# Patient Record
Sex: FEMALE | Race: WHITE | Employment: FULL TIME | URBAN - METROPOLITAN AREA
[De-identification: names, ages, dates, MRNs, and addresses within clinical notes are randomized per-mention and may not be internally consistent; named-entity substitution may affect disease eponyms.]

---

## 2019-01-21 ENCOUNTER — HOSPITAL ENCOUNTER (OUTPATIENT)
Dept: PHYSICAL THERAPY | Age: 29
Discharge: HOME | End: 2019-01-21
Payer: COMMERCIAL

## 2019-01-21 PROCEDURE — 97161 PT EVAL LOW COMPLEX 20 MIN: CPT

## 2019-01-21 PROCEDURE — 97110 THERAPEUTIC EXERCISES: CPT

## 2019-01-21 NOTE — THERAPY EVALUATION
Geovanny Rubi : 1990 Payor: Kajal Orozco / Plan: On license of [facility] / Product Type: PPO /  
 [address]  at [facility] [address], [address], [place] [address] [phone]   [phone] OUTPATIENT PHYSICAL THERAPY:Initial Assessment and Daily Note 2019 ICD-10: Treatment Diagnosis: Low back pain (M54.5); Muscle spasm of back (M62.830); Muscle weakness (generalized) (M62.81) Precautions/Allergies:  
Patient has no allergy information on record. MD Orders: Self referred  MEDICAL/REFERRING DIAGNOSIS: 
Low back pain [M54.5] DATE OF ONSET: 2018 REFERRING PHYSICIAN: Referred, Self, MD 
RETURN PHYSICIAN APPOINTMENT: N/A  
INITIAL ASSESSMENT:  Ms. Tiana Olmstead presents to PT eval w/ c/o R glute/hip and R sided lower back pain that started late 2018. She reports she has a good deal of pain w/ walking and w/ tennis. She displayed decreased core strength, decreased proximal hip strength, and decreased balance. Her R glute was tender w/ palpation. She had decreased symptoms and increased mobility following therapy session. She will benefit from continued skilled PT services to improve her deficits listed below. PROBLEM LIST (Impacting functional limitations): 1. Decreased Strength 2. Decreased ADL/Functional Activities 3. Decreased Ambulation Ability/Technique 4. Decreased Balance 5. Increased Pain 6. Decreased Activity Tolerance 7. Decreased Flexibility/Joint Mobility 8. Decreased [place] with Home Exercise Program INTERVENTIONS PLANNED: 
1. Balance Exercise 2. Cold 3. Electrical Stimulation 4. Heat 5. Home Exercise Program (HEP) 6. Manual Therapy 7. Neuromuscular Re-education/Strengthening 8. Range of Motion (ROM) 9. Therapeutic Exercise/Strengthening TREATMENT PLAN: 
Effective Dates: 2019 TO 2019 (30 days). Frequency/Duration: 2 times a week for 30 Days GOALS: (Goals have been discussed and agreed upon with patient.) Short-Term Functional Goals: Time Frame: 3 weeks 1. Pt will be independent w/ HEP in order to improve outcomes and decrease pain. 2. Pt will report pain of 4/10 or less while performing ADLs in order to improve functional mobility. 3. Pt will reports tenderness decrease by 25% or more in R glute in order to show decreased pain and increased mobility. Discharge Goals: Time Frame: 4 weeks 1. Pt will have Oswestry score of 12 or less in order to report decreased pain and decreased functional impairments. 2. Pt will report pain of 1/10 or less while performing ADLs in order to improve functional mobility. 3. Pt will return to tennis w/ pain 1/10 or less in order to return to her PLOF. Rehabilitation Potential For Stated Goals: Good Regarding [facility] therapy, I certify that the treatment plan above will be carried out by a therapist or under their direction. Thank you for this referral, 
Tyron Null, PT Referring Physician Signature: Referred, Self, MD            Date The information in this section was collected on 1/21/19 (except where otherwise noted). HISTORY:  
History of Present Injury/Illness (Reason for Referral): 
Pt reports her R hip/lower back started hurting in December after she switching from working out w/ weights to only playing tennis. She is scared to massage the muscle in her butt due to fear she is going to hurt herself. She is self referred. Past Medical History/Comorbidities: Ms. Bobie Schwab  has no past medical history on file. Ms. Bobie Schwab  has no past surgical history on file. Social History/Living Environment:  
  Pt lives in apartment w/ roommate Prior Level of Function/Work/Activity: 
Independent, works full time as , works out 3-5x a week on treadmill or on tennis court Dominant Side:  
      RIGHT Current Medications: Ambulatory/Rehab Services H2 Model Falls Risk Assessment Risk Factors: 
     No Risk Factors Identified Ability to Rise from Chair: 
     (0)  Ability to rise in a single movement Falls Prevention Plan: No modifications necessary Total: (5 or greater = High Risk): 0  
 ©2010 [facility] Patent #[id number]. Federal Law prohibits the replication, distribution or use without written permission from [facility] [employer] No current outpatient medications on file. Date Last Reviewed:  1/21/2019 Number of Personal Factors/Comorbidities that affect the Plan of Care: 1-2: MODERATE COMPLEXITY EXAMINATION:  
Observation/Orthostatic Postural Assessment:   
      Pt very timid and fearful she is going to hurt herself w/ exercises Palpation:   
      Tender to R glute muscles w/ palpation, no tenderness noted in lumbar muscles Special Tests:  - tenderness at L2-4 Piriformis stretch - tight on R compared to L Leg length - no difference noted Functional Mobility:  
      Gait/Ambulation:  Independent, antalgic Transfers:  Independent, able to rise w/o hands Bed Mobility:  Independent Balance:   
      Decreased on BLEs w/ R worse than L 
 
Joint/Muscle ROM Strength Updates Hip flexion Slight decrease on R compared to L 4-/5 R, 5/5 L Hip extension Slight decrease on R compared to L 4-/5 R, 5/5 L Hip abduction WFL 4/5 R, 5/5 L Knee extension WFL 5/5 R, 5/5 L Knee flexion WFL 4/5 R, 5/5 L   
DF WFL 5/5 R, 5/5 L Body Structures Involved: 1. Bones 2. Joints 3. Muscles 4. Ligaments Body Functions Affected: 1. Mental 
2. Sensory/Pain 3. Neuromusculoskeletal 
4. Movement Related Activities and Participation Affected: 1. General Tasks and Demands 2. Mobility 3. Self Care 4. Domestic Life 5. Interpersonal Interactions and Relationships 6. Community, Social and [place] Number of elements (examined above) that affect the Plan of Care: 3: MODERATE COMPLEXITY CLINICAL PRESENTATION:  
Presentation: Stable and uncomplicated: LOW COMPLEXITY CLINICAL DECISION MAKING:  
Outcome Measure: Tool Used: Modified Oswestry Low Back Pain Questionnaire Score:  Initial: 14/50  Most Recent: X/50 (Date: -- ) Interpretation of Score: Each section is scored on a 0-5 scale, 5 representing the greatest disability. The scores of each section are added together for a total score of 50. Medical Necessity:  
· Patient is expected to demonstrate progress in strength, range of motion, balance and coordination to increase independence with functional tasks. Reason for Services/Other Comments: 
· Patient continues to demonstrate capacity to improve strength, ROM, balance, mobility which will increase independence. Use of outcome tool(s) and clinical judgement create a POC that gives a: Clear prediction of patient's progress: LOW COMPLEXITY  
  
 
 
 
TREATMENT:  
(In addition to Assessment/Re-Assessment sessions the following treatments were rendered) Pre-treatment Symptoms/Complaints:  See above. Pain: Initial:  
Pain Intensity 1: 7  Post Session:  No number value given, pt reported feeling much better THERAPEUTIC EXERCISE: (15 minutes):  Exercises per grid below to improve mobility, strength and balance. Required moderate verbal and tactile cues to promote proper body alignment, promote proper body posture and promote proper body mechanics. Progressed resistance and range as indicated. MANUAL THERAPY: (5 minutes): Soft tissue mobilization was utilized and necessary because of the patient's restricted joint motion, painful spasm, loss of articular motion and restricted motion of soft tissue. Date: 
1/21/19 Date: 
 Date: Activity/Exercise Parameters Parameters Parameters OKTC 5x B Piriformis stretch seated 5x B Bridge 10x Clamshell 10x B YTB    
 Opposite arm and leg lifts prone 10x B Primal press 10x Dead bug 5x B Hand to knee hold 5x B Treatment/Session Assessment:   
· Response to Treatment:  Pt reported decreased pain following STM to R glute and stretches to R glute. Additionally, she displayed improved ROM. · Compliance with Program/Exercises: Will assess as treatment progresses. · Recommendations/Intent for next treatment session: \"Next visit will focus on advancements to more challenging activities\". [employer] Portal 
Primal press 2x10 OKTC 2x10 Bridge 2x10 Clamshell 2x10 Opposite arm and leg lifts prone 10x2 Variance from POC: none PT Patient Time In/Time Out Time In: 8570 Time Out: 1130 Treatment time: 20 [facility], PT

## 2019-01-25 ENCOUNTER — HOSPITAL ENCOUNTER (OUTPATIENT)
Dept: PHYSICAL THERAPY | Age: 29
Discharge: HOME | End: 2019-01-25
Payer: COMMERCIAL

## 2019-01-25 PROCEDURE — 97140 MANUAL THERAPY 1/> REGIONS: CPT

## 2019-01-25 PROCEDURE — 97110 THERAPEUTIC EXERCISES: CPT

## 2019-01-25 NOTE — PROGRESS NOTES
Nathanael Broussard : 1990 Payor: Antonio Morley / Plan: [facility] / Product Type: PPO /  
 [address]  at [facility] [address], [address], [place] [address] [phone]   [phone] OUTPATIENT PHYSICAL THERAPY:Daily Note 2019 ICD-10: Treatment Diagnosis: Low back pain (M54.5); Muscle spasm of back (M62.830); Muscle weakness (generalized) (M62.81) Precautions/Allergies:  
Patient has no allergy information on record. MD Orders: Self referred  MEDICAL/REFERRING DIAGNOSIS: 
Low back pain [M54.5] DATE OF ONSET: 2018 REFERRING PHYSICIAN: Referred, Self, MD 
RETURN PHYSICIAN APPOINTMENT: N/A  
INITIAL ASSESSMENT:  Ms. Jakub Veliz presents to PT eval w/ c/o R glute/hip and R sided lower back pain that started late 2018. She reports she has a good deal of pain w/ walking and w/ tennis. She displayed decreased core strength, decreased proximal hip strength, and decreased balance. Her R glute was tender w/ palpation. She had decreased symptoms and increased mobility following therapy session. She will benefit from continued skilled PT services to improve her deficits listed below. PROBLEM LIST (Impacting functional limitations): 1. Decreased Strength 2. Decreased ADL/Functional Activities 3. Decreased Ambulation Ability/Technique 4. Decreased Balance 5. Increased Pain 6. Decreased Activity Tolerance 7. Decreased Flexibility/Joint Mobility 8. Decreased [place] with Home Exercise Program INTERVENTIONS PLANNED: 
1. Balance Exercise 2. Cold 3. Electrical Stimulation 4. Heat 5. Home Exercise Program (HEP) 6. Manual Therapy 7. Neuromuscular Re-education/Strengthening 8. Range of Motion (ROM) 9. Therapeutic Exercise/Strengthening TREATMENT PLAN: 
Effective Dates: 2019 TO 2019 (30 days). Frequency/Duration: 2 times a week for 30 Days GOALS: (Goals have been discussed and agreed upon with patient.) Short-Term Functional Goals: Time Frame: 3 weeks 1. Pt will be independent w/ HEP in order to improve outcomes and decrease pain. 2. Pt will report pain of 4/10 or less while performing ADLs in order to improve functional mobility. 3. Pt will reports tenderness decrease by 25% or more in R glute in order to show decreased pain and increased mobility. Discharge Goals: Time Frame: 4 weeks 1. Pt will have Oswestry score of 12 or less in order to report decreased pain and decreased functional impairments. 2. Pt will report pain of 1/10 or less while performing ADLs in order to improve functional mobility. 3. Pt will return to tennis w/ pain 1/10 or less in order to return to her PLOF. Rehabilitation Potential For Stated Goals: Good Regarding [facility] therapy, I certify that the treatment plan above will be carried out by a therapist or under their direction. Thank you for this referral, 
Magalie Brown, PT Referring Physician Signature: Referred, Self, MD            Date The information in this section was collected on 1/21/19 (except where otherwise noted). HISTORY:  
History of Present Injury/Illness (Reason for Referral): 
Pt reports her R hip/lower back started hurting in December after she switching from working out w/ weights to only playing tennis. She is scared to massage the muscle in her butt due to fear she is going to hurt herself. She is self referred. Past Medical History/Comorbidities: Ms. Derek Tsang  has no past medical history on file. Ms. Dreek Tsang  has no past surgical history on file. Social History/Living Environment:  
  Pt lives in apartment w/ roommate Prior Level of Function/Work/Activity: 
Independent, works full time as , works out 3-5x a week on treadmill or on tennis court Dominant Side:  
      RIGHT Current Medications: Ambulatory/Rehab Services H2 Model Falls Risk Assessment Risk Factors: 
     No Risk Factors Identified Ability to Rise from Chair: 
     (0)  Ability to rise in a single movement Falls Prevention Plan: No modifications necessary Total: (5 or greater = High Risk): 0  
 ©2010 [facility] [place] [facility]\"" Patent #[id number]. Federal Law prohibits the replication, distribution or use without written permission from [facility] [employer] No current outpatient medications on file. Date Last Reviewed:  1/25/2019 Number of Personal Factors/Comorbidities that affect the Plan of Care: 1-2: MODERATE COMPLEXITY EXAMINATION:  
Observation/Orthostatic Postural Assessment:   
      Pt very timid and fearful she is going to hurt herself w/ exercises Palpation:   
      Tender to R glute muscles w/ palpation, no tenderness noted in lumbar muscles Special Tests:  - tenderness at L2-4 Piriformis stretch - tight on R compared to L Leg length - no difference noted Functional Mobility:  
      Gait/Ambulation:  Independent, antalgic Transfers:  Independent, able to rise w/o hands Bed Mobility:  Independent Balance:   
      Decreased on BLEs w/ R worse than L 
 
Joint/Muscle ROM Strength Updates Hip flexion Slight decrease on R compared to L 4-/5 R, 5/5 L Hip extension Slight decrease on R compared to L 4-/5 R, 5/5 L Hip abduction WFL 4/5 R, 5/5 L Knee extension WFL 5/5 R, 5/5 L Knee flexion WFL 4/5 R, 5/5 L   
DF WFL 5/5 R, 5/5 L Body Structures Involved: 1. Bones 2. Joints 3. Muscles 4. Ligaments Body Functions Affected: 1. Mental 
2. Sensory/Pain 3. Neuromusculoskeletal 
4. Movement Related Activities and Participation Affected: 1. General Tasks and Demands 2. Mobility 3. Self Care 4. Domestic Life 5. Interpersonal Interactions and Relationships 6. Community, Social and [place] Number of elements (examined above) that affect the Plan of Care: 3: MODERATE COMPLEXITY CLINICAL PRESENTATION:  
Presentation: Stable and uncomplicated: LOW COMPLEXITY CLINICAL DECISION MAKING:  
Outcome Measure: Tool Used: Modified Oswestry Low Back Pain Questionnaire Score:  Initial: 14/50  Most Recent: X/50 (Date: -- ) Interpretation of Score: Each section is scored on a 0-5 scale, 5 representing the greatest disability. The scores of each section are added together for a total score of 50. Medical Necessity:  
· Patient is expected to demonstrate progress in strength, range of motion, balance and coordination to increase independence with functional tasks. Reason for Services/Other Comments: 
· Patient continues to demonstrate capacity to improve strength, ROM, balance, mobility which will increase independence. Use of outcome tool(s) and clinical judgement create a POC that gives a: Clear prediction of patient's progress: LOW COMPLEXITY  
  
 
 
 
TREATMENT:  
(In addition to Assessment/Re-Assessment sessions the following treatments were rendered) Pre-treatment Symptoms/Complaints:  See above. Pain: Initial:  
Pain Intensity 1: 5 Pain Location 1: Hip, Back Pain Orientation 1: Right Pain Intervention(s) 1: Exercise  Post Session:  No number value given, pt reported feeling much better THERAPEUTIC EXERCISE: (30 minutes):  Exercises per grid below to improve mobility, strength and balance. Required moderate verbal and tactile cues to promote proper body alignment, promote proper body posture and promote proper body mechanics. Progressed resistance, range and complexity of movement as indicated. MANUAL THERAPY: (25 minutes): Joint mobilization and Soft tissue mobilization was utilized and necessary because of the patient's restricted joint motion, painful spasm, loss of articular motion and restricted motion of soft tissue. Manual: PROM with stretching to R hip, piriformis, glutes, hamstrings, IR, ER.  R hip distraction with log rolling. MET with R hip flexion, L hip extension, shotgun with hip ab/add isometrics in hooklying to correct R posterior pelvic rotation. Date: 
1/25/2019 (2) Activity/Exercise Parameters Clamshells R side on top 2 x 10 reps for improved strength Supine trunk rotation with leg across body 3 x 30 seconds for improved flexibility Bridging with leg lift B 2 x 8 reps for improved strength and stability Child's pose 3 x 30 seconds for improved flexibility Child's pose with sidebend to L 3 x 30 seconds for improved flexibility Cat/cow stretch 5 x 10 seconds each way for improved flexibility Piriformis roll with orange spiky ball X 3 minutes to reduce trigger points. Treatment/Session Assessment:   
· Response to Treatment:  Pt making slow improvements, and she had decreased pain at end of session. · Compliance with Program/Exercises: Yes. Pt instructed to increase to 1-2 times a day for HEP. · Recommendations/Intent for next treatment session: \"Next visit will focus on advancements to more challenging activities\". Pt to try new exercises and stretches for HEP independently for awhile. Will call if she needs any further appointments. Variance from POC: Decrease frequency due to pt's work schedule. On hold for now until pt calls for any further appointments. PT Patient Time In/Time Out Time In: 6051 Time Out: 1330 Treatment time: 55 minutes Teri Reyes, PT

## 2019-02-20 NOTE — THERAPY DISCHARGE
Maria Del Rosario Camara : 1990 Payor: Ashish Cargo / Plan: [place] [facility] / Product Type: PPO /  
 [address]  at [facility] [address], [address], [place] [address] [phone]   [phone] OUTPATIENT PHYSICAL THERAPY:Discontinuation Summary 2019 ICD-10: Treatment Diagnosis: Low back pain (M54.5); Muscle spasm of back (M62.830); Muscle weakness (generalized) (M62.81) Precautions/Allergies:  
Patient has no allergy information on record. MD Orders: Self referred  MEDICAL/REFERRING DIAGNOSIS: 
Low back pain [M54.5] DATE OF ONSET: 2018 REFERRING PHYSICIAN: Referred, Self, MD 
RETURN PHYSICIAN APPOINTMENT: N/A  
INITIAL ASSESSMENT:  Ms. Amol Navarrete presents to PT eval w/ c/o R glute/hip and R sided lower back pain that started late 2018. She reports she has a good deal of pain w/ walking and w/ tennis. She displayed decreased core strength, decreased proximal hip strength, and decreased balance. Her R glute was tender w/ palpation. She had decreased symptoms and increased mobility following therapy session. She will benefit from continued skilled PT services to improve her deficits listed below. PROBLEM LIST (Impacting functional limitations): 1. Decreased Strength 2. Decreased ADL/Functional Activities 3. Decreased Ambulation Ability/Technique 4. Decreased Balance 5. Increased Pain 6. Decreased Activity Tolerance 7. Decreased Flexibility/Joint Mobility 8. Decreased [place] with Home Exercise Program INTERVENTIONS PLANNED: 
1. Balance Exercise 2. Cold 3. Electrical Stimulation 4. Heat 5. Home Exercise Program (HEP) 6. Manual Therapy 7. Neuromuscular Re-education/Strengthening 8. Range of Motion (ROM) 9. Therapeutic Exercise/Strengthening GOALS: (Goals have been discussed and agreed upon with patient.) Short-Term Functional Goals: Time Frame: 3 weeks 1. Pt will be independent w/ HEP in order to improve outcomes and decrease pain. 2. Pt will report pain of 4/10 or less while performing ADLs in order to improve functional mobility. 3. Pt will reports tenderness decrease by 25% or more in R glute in order to show decreased pain and increased mobility. Discharge Goals: Time Frame: 4 weeks 1. Pt will have Oswestry score of 12 or less in order to report decreased pain and decreased functional impairments. 2. Pt will report pain of 1/10 or less while performing ADLs in order to improve functional mobility. 3. Pt will return to tennis w/ pain 1/10 or less in order to return to her PLOF. Rehabilitation Potential For Stated Goals: James Francois has been seen in physical therapy from 1/21/19 to 1/25/19 for 2 visits. Treatment has been discontinued at this time due to patient failing to return for additional treatment. The below goals were met prior to discontinuation. Some goals were not met due to pt not returning to PT. Krystina Daniels, PT The information in this section was collected on 1/21/19 (except where otherwise noted). HISTORY:  
History of Present Injury/Illness (Reason for Referral): 
Pt reports her R hip/lower back started hurting in December after she switching from working out w/ weights to only playing tennis. She is scared to massage the muscle in her butt due to fear she is going to hurt herself. She is self referred. Past Medical History/Comorbidities: Ms. Kelly Sanchez  has no past medical history on file. Ms. Kelly Sanchez  has no past surgical history on file. Social History/Living Environment:  
  Pt lives in apartment w/ roommate Prior Level of Function/Work/Activity: 
Independent, works full time as , works out 3-5x a week on treadmill or on tennis court Dominant Side:  
      RIGHT Current Medications:   
 
 Ambulatory/Rehab Services H2 Model Falls Risk Assessment Risk Factors: No Risk Factors Identified Ability to Rise from Chair: 
     (0)  Ability to rise in a single movement Falls Prevention Plan: No modifications necessary Total: (5 or greater = High Risk): 0  
 ©2010 [facility] Patent #[id number]. Federal Law prohibits the replication, distribution or use without written permission from [facility] of "[employer] No current outpatient medications on file. Date Last Reviewed:  2/20/2019 Number of Personal Factors/Comorbidities that affect the Plan of Care: 1-2: MODERATE COMPLEXITY EXAMINATION:  
Observation/Orthostatic Postural Assessment:   
      Pt very timid and fearful she is going to hurt herself w/ exercises Palpation:   
      Tender to R glute muscles w/ palpation, no tenderness noted in lumbar muscles Special Tests:  - tenderness at L2-4 Piriformis stretch - tight on R compared to L Leg length - no difference noted Functional Mobility:  
      Gait/Ambulation:  Independent, antalgic Transfers:  Independent, able to rise w/o hands Bed Mobility:  Independent Balance:   
      Decreased on BLEs w/ R worse than L 
 
Joint/Muscle ROM Strength Updates Hip flexion Slight decrease on R compared to L 4-/5 R, 5/5 L Hip extension Slight decrease on R compared to L 4-/5 R, 5/5 L Hip abduction WFL 4/5 R, 5/5 L Knee extension WFL 5/5 R, 5/5 L Knee flexion WFL 4/5 R, 5/5 L   
DF WFL 5/5 R, 5/5 L Body Structures Involved: 1. Bones 2. Joints 3. Muscles 4. Ligaments Body Functions Affected: 1. Mental 
2. Sensory/Pain 3. Neuromusculoskeletal 
4. Movement Related Activities and Participation Affected: 1. General Tasks and Demands 2. Mobility 3. Self Care 4. Domestic Life 5. Interpersonal Interactions and Relationships 6. Community, Social and Independence [place] Number of elements (examined above) that affect the Plan of Care: 3: MODERATE COMPLEXITY CLINICAL PRESENTATION:  
Presentation: Stable and uncomplicated: LOW COMPLEXITY CLINICAL DECISION MAKING:  
Outcome Measure: Tool Used: Modified Oswestry Low Back Pain Questionnaire Score:  Initial: 14/50  Most Recent: X/50 (Date: -- ) Interpretation of Score: Each section is scored on a 0-5 scale, 5 representing the greatest disability. The scores of each section are added together for a total score of 50. Medical Necessity:  
· Patient is expected to demonstrate progress in strength, range of motion, balance and coordination to increase independence with functional tasks. Reason for Services/Other Comments: 
· Patient continues to demonstrate capacity to improve strength, ROM, balance, mobility which will increase independence. Use of outcome tool(s) and clinical judgement create a POC that gives a: Clear prediction of patient's progress: LOW COMPLEXITY

## 2019-03-25 ENCOUNTER — HOSPITAL ENCOUNTER (OUTPATIENT)
Dept: PHYSICAL THERAPY | Age: 29
Discharge: HOME | End: 2019-03-25
Payer: COMMERCIAL

## 2019-03-25 PROCEDURE — 97110 THERAPEUTIC EXERCISES: CPT

## 2019-03-25 PROCEDURE — 97161 PT EVAL LOW COMPLEX 20 MIN: CPT

## 2019-03-25 NOTE — THERAPY EVALUATION
Amber Aliyah : 1990 Primary: [address] Secondary:  Therapy Center at [facility] 
[facility], [address] [place] [address] [phone]   [phone] OUTPATIENT PHYSICAL THERAPY:Initial Assessment 3/25/2019 ICD-10: Treatment Diagnosis: R hip pain (M25.551), R hip stiffness (M25.651), muscle weakness, generalized (M62.81) Precautions/Allergies:  
Patient has no allergy information on record. MD Orders: Self referred MEDICAL/REFERRING DIAGNOSIS: 
Hip pain [M25.559] DATE OF ONSET: 2019 REFERRING PHYSICIAN: Referred, Self, MD 
RETURN PHYSICIAN APPOINTMENT: TBD INITIAL ASSESSMENT:  Ms. Romero Santos presents with R hip pain since 2019. Pt was seen here for PT at that time, when her pain was only posterior. Pt improved, and wanted to work on HEP independently. Pt now for about 2 weeks has had R hip anterior pain as well. Pt does not remember any specific injury, but thinks it might have happened when playing tennis. Pt presents with decreased strength, ROM, and increased pain. Pt will benefit from skilled PT to address these deficits. PROBLEM LIST (Impacting functional limitations): 1. Decreased Strength 2. Decreased ADL/Functional Activities 3. Increased Pain 4. Decreased Activity Tolerance 5. Decreased Flexibility/Joint Mobility 6. Decreased [place] with Home Exercise Program INTERVENTIONS PLANNED: (Treatment may consist of any combination of the following) 1. Home Exercise Program (HEP) 2. Manual Therapy 3. Neuromuscular Re-education/Strengthening 4. Range of Motion (ROM) 5. Therapeutic Activites 6. Therapeutic Exercise/Strengthening 7. Transcutaneous Electrical Nerve Stimulation (TENS) 8. Ultrasound (US)  
TREATMENT PLAN: 
Effective Dates: 3/25/2019 TO 2019 (30 days). Frequency/Duration: 1 time a week to biweekly for 30 Day(s) GOALS: (Goals have been discussed and agreed upon with patient.) Discharge Goals: Time Frame: 4 weeks 1. Pt independent with final HEP, and she is able to perform without pain or difficulty. 2. Pt will improve gross R hip strength to 5/5 to allow her to lift objects from floor. 3. Pt will improve R hip flexion AROM to WNL of L to allow her to bend down into squat without pain. 4. Pt will decrease pain at worst to 2/10 to allow for improved quality of living. 5. Pt will improve LEFS by 3 points to allow for improved ADLs. Rehabilitation Potential For Stated Goals: James Armstrong PT The information in this section was collected on 3/25/19 (except where otherwise noted). HISTORY:  
History of Present Injury/Illness (Reason for Referral): Pt with R hip pain since Jan 2019 Past Medical History/Comorbidities: Ms. Bobie Schwab  has no past medical history on file. Ms. Bobie Schwab  has no past surgical history on file. Social History/Living Environment:  
  Lives alone in apartment Prior Level of Function/Work/Activity: 
Independent with all ADLs, active with tennis. Dominant Side:  
      [facility] Ambulatory/Rehab Services H2 Model Falls Risk Assessment Risk Factors: 
     No Risk Factors Identified Ability to Rise from Chair: 
     (0)  Ability to rise in a single movement Falls Prevention Plan: No modifications necessary Total: (5 or greater = High Risk): 0  
 ©2010 [facility] [address] [place] [address] States Patent #[id number]. Federal Law prohibits the replication, distribution or use without written permission from [facility] [employer] Current Medications: No current outpatient medications on file. Lamictal (mood) Date Last Reviewed:  3/25/2019 Number of Personal Factors/Comorbidities that affect the Plan of Care: 0: LOW COMPLEXITY EXAMINATION:  
Observation/Orthostatic Postural Assessment: Pt with R posterior pelvic rotation. Palpation:   
      Tenderness at R hip flexor area ROM:   
 R hip flexion decreased about 5% Strength:   
      R hip grossly 4+/5 with pain Body Structures Involved: 1. Joints 2. Muscles Body Functions Affected: 1. Neuromusculoskeletal Activities and Participation Affected: 1. Mobility Number of elements (examined above) that affect the Plan of Care: 1-2: LOW COMPLEXITY CLINICAL PRESENTATION:  
Presentation: Stable and uncomplicated: LOW COMPLEXITY CLINICAL DECISION MAKING:  
Outcome Measure: Tool Used: Lower Extremity Functional Scale (LEFS) Score:  Initial: 75/80 Most Recent: X/80 (Date: -- ) Interpretation of Score: 20 questions each scored on a 5 point scale with 0 representing \"extreme difficulty or unable to perform\" and 4 representing \"no difficulty\". The lower the score, the greater the functional disability. 80/80 represents no disability. Minimal detectable change is 9 points. Medical Necessity:  
· Patient is expected to demonstrate progress in strength, range of motion, balance, coordination and functional technique to increase independence with ADLs. Reason for Services/Other Comments: 
· Patient continues to require modification of therapeutic interventions to increase complexity of exercises. Use of outcome tool(s) and clinical judgement create a POC that gives a: Clear prediction of patient's progress: LOW COMPLEXITY

## 2019-03-25 NOTE — PROGRESS NOTES
Charleneanna Collazo : 1990 Primary: [address] Secondary:  Therapy Center at [facility] 
[address] [place] [address] [phone]   [phone] OUTPATIENT PHYSICAL THERAPY: Daily Treatment Note 3/25/2019 Pre-treatment Symptoms/Complaints:  See evaluation for details. Pain: Initial: Pain Intensity 1: 7 Pain Location 1: Hip Pain Orientation 1: Right Pain Intervention(s) 1: Exercise manual Post Session:  3/10 Medications Last Reviewed:  3/25/2019 Updated Objective Findings:  See evaluation note from today TREATMENT:  
 
THERAPEUTIC EXERCISE: (15 minutes):  Exercises per grid below to improve mobility, strength, balance and coordination. Required moderate visual, verbal and manual cues to promote proper body alignment, promote proper body posture and promote proper body mechanics. Progressed complexity of movement as indicated. MANUAL THERAPY: (5 minutes): Joint mobilization and Manipulation was utilized and necessary because of the patient's restricted joint motion and painful spasm. Manual: Pt assess with R posterior pelvic rotation. Distraction to R LE inferiorly with quick grade V manipulation. 100% correction with this technique. Date: 
3/25/2019 (1) Activity/Exercise Parameters  
bridging 2 x 10 reps for improved strength and stability Hip abduction hooklying with band 2 x 10 reps with red band for improved strength and stability Hip abduction roll out in hooklying with band 2 x 10 reps with red band for improved strength and stability Supine IT band stretch with strap 3 x 30 seconds for improved flexibility Hip flexor stretch off edge of table 3 x 30 seconds for improved flexibility Bridging with band 2 x 10 reps with red band for improved strength and stability [employer] Portal 
Treatment/Session Summary: · Response to Treatment:  Pt with decreased pain at end of session, and back in 100% alignment. Pt was able to do a squat at end of session without pain. · Communication/Consultation:  None today · Equipment provided today:  given HEP and red band · Recommendations/Intent for next treatment session: Pt to try HEP independently. Will call for further appointments if needed. If pt does not call for more appointments, will D/C after 30 days. Treatment Plan of Care Effective Dates:  3/25/19 to 4/24/19 Total Treatment Billable Duration:  20 minutes PT Patient Time In/Time Out Time In: 1000 Time Out: 1040 Teri Reyes PT No future appointments.

## 2019-04-08 ENCOUNTER — APPOINTMENT (RX ONLY)
Dept: URBAN - METROPOLITAN AREA CLINIC 349 | Facility: CLINIC | Age: 29
Setting detail: DERMATOLOGY
End: 2019-04-08

## 2019-04-08 DIAGNOSIS — L70.0 ACNE VULGARIS: ICD-10-CM

## 2019-04-08 DIAGNOSIS — L73.9 FOLLICULAR DISORDER, UNSPECIFIED: ICD-10-CM

## 2019-04-08 DIAGNOSIS — L738 OTHER SPECIFIED DISEASES OF HAIR AND HAIR FOLLICLES: ICD-10-CM

## 2019-04-08 DIAGNOSIS — L81.4 OTHER MELANIN HYPERPIGMENTATION: ICD-10-CM

## 2019-04-08 DIAGNOSIS — L663 OTHER SPECIFIED DISEASES OF HAIR AND HAIR FOLLICLES: ICD-10-CM

## 2019-04-08 PROBLEM — F41.9 ANXIETY DISORDER, UNSPECIFIED: Status: ACTIVE | Noted: 2019-04-08

## 2019-04-08 PROBLEM — F32.9 MAJOR DEPRESSIVE DISORDER, SINGLE EPISODE, UNSPECIFIED: Status: ACTIVE | Noted: 2019-04-08

## 2019-04-08 PROBLEM — L02.32 FURUNCLE OF BUTTOCK: Status: ACTIVE | Noted: 2019-04-08

## 2019-04-08 PROCEDURE — ? COUNSELING

## 2019-04-08 PROCEDURE — 99213 OFFICE O/P EST LOW 20 MIN: CPT

## 2019-04-08 PROCEDURE — ? TREATMENT REGIMEN

## 2019-04-08 PROCEDURE — ? PRESCRIPTION

## 2019-04-08 PROCEDURE — ? RECOMMENDATIONS

## 2019-04-08 RX ORDER — CLINDAMYCIN PHOSPHATE 10 MG/G
GEL TOPICAL
Qty: 1 | Refills: 2 | Status: ERX | COMMUNITY
Start: 2019-04-08

## 2019-04-08 RX ORDER — TRETINOIN 0.6 MG/G
GEL TOPICAL
Qty: 1 | Refills: 1 | Status: ERX | COMMUNITY
Start: 2019-04-08

## 2019-04-08 RX ORDER — DAPSONE 75 MG/G
GEL TOPICAL
Qty: 1 | Refills: 1 | Status: ERX | COMMUNITY
Start: 2019-04-08

## 2019-04-08 RX ADMIN — CLINDAMYCIN PHOSPHATE: 10 GEL TOPICAL at 19:38

## 2019-04-08 RX ADMIN — TRETINOIN: 0.6 GEL TOPICAL at 19:36

## 2019-04-08 RX ADMIN — DAPSONE: 75 GEL TOPICAL at 19:36

## 2019-04-08 ASSESSMENT — LOCATION SIMPLE DESCRIPTION DERM
LOCATION SIMPLE: LEFT BUTTOCK
LOCATION SIMPLE: RIGHT CHEEK
LOCATION SIMPLE: RIGHT BUTTOCK

## 2019-04-08 ASSESSMENT — LOCATION DETAILED DESCRIPTION DERM
LOCATION DETAILED: LEFT BUTTOCK
LOCATION DETAILED: RIGHT BUTTOCK
LOCATION DETAILED: RIGHT INFERIOR MEDIAL MALAR CHEEK

## 2019-04-08 ASSESSMENT — LOCATION ZONE DERM
LOCATION ZONE: TRUNK
LOCATION ZONE: FACE

## 2019-04-08 NOTE — PROCEDURE: TREATMENT REGIMEN
Detail Level: Zone
Plan: Patient wants Spironolactone. But we will have to check potassium level since she’s also on birth control. she declined this so we will try ocp for 3-5 months. Discussed antibiotic but she declined.
Initiate Treatment: Retin A .06% bedtime\\nAczone In a.m.
Continue Regimen: Oral contraceptives Lo-Estrin (prescription from OBGYN)

## 2019-04-08 NOTE — PROCEDURE: RECOMMENDATIONS
Detail Level: Zone
Recommendations (Free Text): Should fade with time, do not pick lesions
Recommendations (Free Text): Benzoyl peroxide 10% like Panoxyl or Benzepro to wash area daily.\\n\\nClindamycin Gel twice daily with flares

## 2019-04-08 NOTE — HPI: PIMPLES (ACNE)
What Type Of Note Output Would You Prefer (Optional)?: Bullet Format
How Severe Is Your Acne?: moderate
Is This A New Presentation, Or A Follow-Up?: Acne
Additional Comments (Use Complete Sentences): Recently started back on birth control.  Had spironolactone and Retin a in past and that seemed to help.\\n\\nHistory of esophageal problems with antibiotics

## 2019-04-08 NOTE — PROCEDURE: COUNSELING
Topical Sulfur Applications Counseling: Topical Sulfur Counseling: Patient counseled that this medication may cause skin irritation or allergic reactions.  In the event of skin irritation, the patient was advised to reduce the amount of the drug applied or use it less frequently.   The patient verbalized understanding of the proper use and possible adverse effects of topical sulfur application.  All of the patient's questions and concerns were addressed.
Isotretinoin Counseling: Patient should get monthly blood tests, not donate blood, not drive at night if vision affected, not share medication, and not undergo elective surgery for 6 months after tx completed. Side effects reviewed, pt to contact office should one occur.
High Dose Vitamin A Pregnancy And Lactation Text: High dose vitamin A therapy is contraindicated during pregnancy and breast feeding.
High Dose Vitamin A Counseling: Side effects reviewed, pt to contact office should one occur.
Tetracycline Pregnancy And Lactation Text: This medication is Pregnancy Category D and not consider safe during pregnancy. It is also excreted in breast milk.
Azithromycin Counseling:  I discussed with the patient the risks of azithromycin including but not limited to GI upset, allergic reaction, drug rash, diarrhea, and yeast infections.
Tetracycline Counseling: Patient counseled regarding possible photosensitivity and increased risk for sunburn.  Patient instructed to avoid sunlight, if possible.  When exposed to sunlight, patients should wear protective clothing, sunglasses, and sunscreen.  The patient was instructed to call the office immediately if the following severe adverse effects occur:  hearing changes, easy bruising/bleeding, severe headache, or vision changes.  The patient verbalized understanding of the proper use and possible adverse effects of tetracycline.  All of the patient's questions and concerns were addressed. Patient understands to avoid pregnancy while on therapy due to potential birth defects.
Erythromycin Counseling:  I discussed with the patient the risks of erythromycin including but not limited to GI upset, allergic reaction, drug rash, diarrhea, increase in liver enzymes, and yeast infections.
Benzoyl Peroxide Counseling: Patient counseled that medicine may cause skin irritation and bleach clothing.  In the event of skin irritation, the patient was advised to reduce the amount of the drug applied or use it less frequently.   The patient verbalized understanding of the proper use and possible adverse effects of benzoyl peroxide.  All of the patient's questions and concerns were addressed.
Benzoyl Peroxide Pregnancy And Lactation Text: This medication is Pregnancy Category C. It is unknown if benzoyl peroxide is excreted in breast milk.
Dapsone Counseling: I discussed with the patient the risks of dapsone including but not limited to hemolytic anemia, agranulocytosis, rashes, methemoglobinemia, kidney failure, peripheral neuropathy, headaches, GI upset, and liver toxicity.  Patients who start dapsone require monitoring including baseline LFTs and weekly CBCs for the first month, then every month thereafter.  The patient verbalized understanding of the proper use and possible adverse effects of dapsone.  All of the patient's questions and concerns were addressed.
Bactrim Pregnancy And Lactation Text: This medication is Pregnancy Category D and is known to cause fetal risk.  It is also excreted in breast milk.
Topical Retinoid Pregnancy And Lactation Text: This medication is Pregnancy Category C. It is unknown if this medication is excreted in breast milk.
Detail Level: Zone
Birth Control Pills Pregnancy And Lactation Text: This medication should be avoided if pregnant and for the first 30 days post-partum.
Spironolactone Counseling: Patient advised regarding risks of diarrhea, abdominal pain, hyperkalemia, birth defects (for female patients), liver toxicity and renal toxicity. The patient may need blood work to monitor liver and kidney function and potassium levels while on therapy. The patient verbalized understanding of the proper use and possible adverse effects of spironolactone.  All of the patient's questions and concerns were addressed.
Doxycycline Counseling:  Patient counseled regarding possible photosensitivity and increased risk for sunburn.  Patient instructed to avoid sunlight, if possible.  When exposed to sunlight, patients should wear protective clothing, sunglasses, and sunscreen.  The patient was instructed to call the office immediately if the following severe adverse effects occur:  hearing changes, easy bruising/bleeding, severe headache, or vision changes.  The patient verbalized understanding of the proper use and possible adverse effects of doxycycline.  All of the patient's questions and concerns were addressed.
Isotretinoin Pregnancy And Lactation Text: This medication is Pregnancy Category X and is considered extremely dangerous during pregnancy. It is unknown if it is excreted in breast milk.
Minocycline Counseling: Patient advised regarding possible photosensitivity and discoloration of the teeth, skin, lips, tongue and gums.  Patient instructed to avoid sunlight, if possible.  When exposed to sunlight, patients should wear protective clothing, sunglasses, and sunscreen.  The patient was instructed to call the office immediately if the following severe adverse effects occur:  hearing changes, easy bruising/bleeding, severe headache, or vision changes.  The patient verbalized understanding of the proper use and possible adverse effects of minocycline.  All of the patient's questions and concerns were addressed.
Topical Retinoid counseling:  Patient advised to apply a pea-sized amount only at bedtime and wait 30 minutes after washing their face before applying.  If too drying, patient may add a non-comedogenic moisturizer. The patient verbalized understanding of the proper use and possible adverse effects of retinoids.  All of the patient's questions and concerns were addressed.
Dapsone Pregnancy And Lactation Text: This medication is Pregnancy Category C and is not considered safe during pregnancy or breast feeding.
Doxycycline Pregnancy And Lactation Text: This medication is Pregnancy Category D and not consider safe during pregnancy. It is also excreted in breast milk but is considered safe for shorter treatment courses.
Azithromycin Pregnancy And Lactation Text: This medication is considered safe during pregnancy and is also secreted in breast milk.
Bactrim Counseling:  I discussed with the patient the risks of sulfa antibiotics including but not limited to GI upset, allergic reaction, drug rash, diarrhea, dizziness, photosensitivity, and yeast infections.  Rarely, more serious reactions can occur including but not limited to aplastic anemia, agranulocytosis, methemoglobinemia, blood dyscrasias, liver or kidney failure, lung infiltrates or desquamative/blistering drug rashes.
Include Pregnancy/Lactation Warning?: No
Spironolactone Pregnancy And Lactation Text: This medication can cause feminization of the male fetus and should be avoided during pregnancy. The active metabolite is also found in breast milk.
Tazorac Counseling:  Patient advised that medication is irritating and drying.  Patient may need to apply sparingly and wash off after an hour before eventually leaving it on overnight.  The patient verbalized understanding of the proper use and possible adverse effects of tazorac.  All of the patient's questions and concerns were addressed.
Erythromycin Pregnancy And Lactation Text: This medication is Pregnancy Category B and is considered safe during pregnancy. It is also excreted in breast milk.
Topical Clindamycin Counseling: Patient counseled that this medication may cause skin irritation or allergic reactions.  In the event of skin irritation, the patient was advised to reduce the amount of the drug applied or use it less frequently.   The patient verbalized understanding of the proper use and possible adverse effects of clindamycin.  All of the patient's questions and concerns were addressed.
Birth Control Pills Counseling: Birth Control Pill Counseling: I discussed with the patient the potential side effects of OCPs including but not limited to increased risk of stroke, heart attack, thrombophlebitis, deep venous thrombosis, hepatic adenomas, breast changes, GI upset, headaches, and depression.  The patient verbalized understanding of the proper use and possible adverse effects of OCPs. All of the patient's questions and concerns were addressed.
Topical Sulfur Applications Pregnancy And Lactation Text: This medication is Pregnancy Category C and has an unknown safety profile during pregnancy. It is unknown if this topical medication is excreted in breast milk.
Topical Clindamycin Pregnancy And Lactation Text: This medication is Pregnancy Category B and is considered safe during pregnancy. It is unknown if it is excreted in breast milk.
Tazorac Pregnancy And Lactation Text: This medication is not safe during pregnancy. It is unknown if this medication is excreted in breast milk.

## 2019-04-08 NOTE — HPI: SKIN LESIONS
How Severe Is Your Skin Lesion?: moderate
Have Your Skin Lesions Been Treated?: not been treated
Is This A New Presentation, Or A Follow-Up?: Skin Lesions
Additional History: Started last year, if she picks they scar, if she leaves them alone they go away with no scarring.

## 2019-04-26 ENCOUNTER — RX ONLY (OUTPATIENT)
Age: 29
Setting detail: RX ONLY
End: 2019-04-26

## 2019-04-26 RX ORDER — SPIRONOLACTONE 100 MG/1
TABLET, FILM COATED ORAL
Qty: 30 | Refills: 2 | Status: ERX | COMMUNITY
Start: 2019-04-26

## 2019-06-05 NOTE — THERAPY DISCHARGE
Saige Francois  : 1990  Primary: [facility]  Secondary:  [address]  at [address]  [phone]   [phone]          OUTPATIENT PHYSICAL THERAPY:Discontinuation Summary 3/25/2019   ICD-10: Treatment Diagnosis: R hip pain (M25.551), R hip stiffness (M25.651), muscle weakness, generalized (M62.81)  Precautions/Allergies:   Patient has no allergy information on record. MD Orders: Self referred MEDICAL/REFERRING DIAGNOSIS:  Hip pain [M25.559]   DATE OF ONSET: 2019  REFERRING PHYSICIAN: Referred, Self, MD  RETURN PHYSICIAN APPOINTMENT: TBD     INITIAL ASSESSMENT:  Ms. Kelly Sanchez presents with R hip pain since 2019. Pt was seen here for PT at that time, when her pain was only posterior. Pt improved, and wanted to work on HEP independently. Pt now for about 2 weeks has had R hip anterior pain as well. Pt does not remember any specific injury, but thinks it might have happened when playing tennis. Pt presents with decreased strength, ROM, and increased pain. Pt will benefit from skilled PT to address these deficits. PROBLEM LIST (Impacting functional limitations):  1. Decreased Strength  2. Decreased ADL/Functional Activities  3. Increased Pain  4. Decreased Activity Tolerance  5. Decreased Flexibility/Joint Mobility  6. Decreased [place] with Home Exercise Program INTERVENTIONS PLANNED: (Treatment may consist of any combination of the following)  1. Home Exercise Program (HEP)  2. Manual Therapy  3. Neuromuscular Re-education/Strengthening  4. Range of Motion (ROM)  5. Therapeutic Activites  6. Therapeutic Exercise/Strengthening  7. Transcutaneous Electrical Nerve Stimulation (TENS)  8. Ultrasound (US)   TREATMENT PLAN:  Effective Dates: 3/25/2019 TO 2019 (30 days).   Frequency/Duration: 1 time a week to biweekly for 30 Day(s)  GOALS: (Goals have been discussed and agreed upon with patient.)    Discharge Goals: Time Frame: 4 weeks  1. Pt independent with final HEP, and she is able to perform without pain or difficulty. 2. Pt will improve gross R hip strength to 5/5 to allow her to lift objects from floor. 3. Pt will improve R hip flexion AROM to WNL of L to allow her to bend down into squat without pain. 4. Pt will decrease pain at worst to 2/10 to allow for improved quality of living. 5. Pt will improve LEFS by 3 points to allow for improved ADLs. Rehabilitation Potential For Stated Goals: Good    6/5/19: Pt only came for her initial evaluation on 3/25/19. Pt has not called for any further appointments. Will discontinue from PT at this time. Radha Steinberg, PT                 The information in this section was collected on 3/25/19 (except where otherwise noted). HISTORY:   History of Present Injury/Illness (Reason for Referral):  Pt with R hip pain since Jan 2019  Past Medical History/Comorbidities:   Ms. Dangelo Zuluaga  has no past medical history on file. Ms. Dangelo Zuluaga  has no past surgical history on file. Social History/Living Environment:     Lives alone in apartment  Prior Level of Function/Work/Activity:  Independent with all ADLs, active with tennis. Dominant Side:         RIGHT     Ambulatory/Rehab Services H2 Model Falls Risk Assessment    Risk Factors:       No Risk Factors Identified Ability to Rise from Chair:       (0)  Ability to rise in a single movement    Falls Prevention Plan:       No modifications necessary   Total: (5 or greater = High Risk): 0    ©2010 [facility] [employer]. All Rights Reserved. [facility] States Patent #[id number]. Federal Law prohibits the replication, distribution or use without written permission from [facility] [employer]     Current Medications:     No current outpatient medications on file.  Lamictal (mood)   Date Last Reviewed:  6/5/2019     Number of Personal Factors/Comorbidities that affect the Plan of Care: 0: LOW COMPLEXITY   EXAMINATION:   Observation/Orthostatic Postural Assessment:          Pt with R posterior pelvic rotation. Palpation:          Tenderness at R hip flexor area  ROM:          R hip flexion decreased about 5%  Strength:          R hip grossly 4+/5 with pain   Body Structures Involved:  1. Joints  2. Muscles Body Functions Affected:  1. Neuromusculoskeletal Activities and Participation Affected:  1. Mobility   Number of elements (examined above) that affect the Plan of Care: 1-2: LOW COMPLEXITY   CLINICAL PRESENTATION:   Presentation: Stable and uncomplicated: LOW COMPLEXITY   CLINICAL DECISION MAKING:   Outcome Measure: Tool Used: Lower Extremity Functional Scale (LEFS)  Score:  Initial: 75/80 Most Recent: X/80 (Date: -- )   Interpretation of Score: 20 questions each scored on a 5 point scale with 0 representing \"extreme difficulty or unable to perform\" and 4 representing \"no difficulty\". The lower the score, the greater the functional disability. 80/80 represents no disability. Minimal detectable change is 9 points. Medical Necessity:   · Patient is expected to demonstrate progress in strength, range of motion, balance, coordination and functional technique to increase independence with ADLs. Reason for Services/Other Comments:  · Patient continues to require modification of therapeutic interventions to increase complexity of exercises.    Use of outcome tool(s) and clinical judgement create a POC that gives a: Clear prediction of patient's progress: LOW COMPLEXITY